# Patient Record
Sex: MALE | Race: OTHER | Employment: OTHER | URBAN - METROPOLITAN AREA
[De-identification: names, ages, dates, MRNs, and addresses within clinical notes are randomized per-mention and may not be internally consistent; named-entity substitution may affect disease eponyms.]

---

## 2020-06-13 ENCOUNTER — APPOINTMENT (OUTPATIENT)
Dept: GENERAL RADIOLOGY | Facility: HOSPITAL | Age: 25
End: 2020-06-13
Attending: EMERGENCY MEDICINE

## 2020-06-13 ENCOUNTER — HOSPITAL ENCOUNTER (EMERGENCY)
Facility: HOSPITAL | Age: 25
Discharge: HOME | End: 2020-06-13
Attending: EMERGENCY MEDICINE

## 2020-06-13 VITALS
HEART RATE: 63 BPM | OXYGEN SATURATION: 99 % | BODY MASS INDEX: 30.31 KG/M2 | SYSTOLIC BLOOD PRESSURE: 136 MMHG | WEIGHT: 200 LBS | DIASTOLIC BLOOD PRESSURE: 78 MMHG | HEIGHT: 68 IN | TEMPERATURE: 98 F | RESPIRATION RATE: 16 BRPM

## 2020-06-13 DIAGNOSIS — M54.16 LUMBAR RADICULOPATHY: Primary | ICD-10-CM

## 2020-06-13 PROCEDURE — 72110 X-RAY EXAM L-2 SPINE 4/>VWS: CPT | Performed by: EMERGENCY MEDICINE

## 2020-06-13 PROCEDURE — 99283 EMERGENCY DEPT VISIT LOW MDM: CPT

## 2020-06-13 RX ORDER — METHYLPREDNISOLONE 4 MG/1
TABLET ORAL
Qty: 1 PACKAGE | Refills: 0 | Status: SHIPPED | OUTPATIENT
Start: 2020-06-13

## 2020-06-13 RX ORDER — HYDROCODONE BITARTRATE AND ACETAMINOPHEN 5; 325 MG/1; MG/1
1-2 TABLET ORAL EVERY 6 HOURS PRN
Qty: 10 TABLET | Refills: 0 | Status: SHIPPED | OUTPATIENT
Start: 2020-06-13 | End: 2020-06-20

## 2020-06-13 NOTE — ED INITIAL ASSESSMENT (HPI)
Left sided pain to left buttocks and left leg x1 year.  Patient states \"its not going away so I thought id come in.\"

## 2020-06-13 NOTE — ED PROVIDER NOTES
Patient Seen in: [facility] Emergency Department      History   Patient presents with:  Back Pain    Stated Complaint: back/leg pain for one year    HPI    This is a pleasant 24-year-old male presenting to the emergency department for leg pain.   Paula Rangel no purulent material or erythema.   No submandibular erythema and no tenderness along the sternocleidomastoid and no nuchal rigidity  Lungs are clear to auscultation bilaterally with no wheezes retractions or rhonchi noted  Cardiovascular exam shows a regul

## 2020-06-17 ENCOUNTER — OFFICE VISIT (OUTPATIENT)
Dept: SURGERY | Facility: CLINIC | Age: 25
End: 2020-06-17

## 2020-06-17 VITALS
BODY MASS INDEX: 30.31 KG/M2 | SYSTOLIC BLOOD PRESSURE: 128 MMHG | DIASTOLIC BLOOD PRESSURE: 80 MMHG | WEIGHT: 200 LBS | HEIGHT: 68 IN | HEART RATE: 66 BPM

## 2020-06-17 DIAGNOSIS — G57.02 PIRIFORMIS SYNDROME OF LEFT SIDE: ICD-10-CM

## 2020-06-17 DIAGNOSIS — M54.16 LUMBAR RADICULOPATHY: Primary | ICD-10-CM

## 2020-06-17 PROCEDURE — 99203 OFFICE O/P NEW LOW 30 MIN: CPT | Performed by: PHYSICIAN ASSISTANT

## 2020-06-17 NOTE — PROGRESS NOTES
Location of Pain:  Pt states that he has been having issues with back pain with buttock pain with radiating pain. Pt states that he has issues with numbness in the left foot. Pt states that he has no issues with weakness.  Pt states that he has no issues wi

## 2020-06-17 NOTE — PROGRESS NOTES
Patient: Lui Hogan  Medical Record Number: [id number]  PCP: None Pcp      HISTORY OF CHIEF COMPLAINT:    Lui Hogan is a 21year old male, who complains of 1 year h/o left buttock pain that radiates down the back of his left leg.  He says when pain ini 10 tablet 0   • methylPREDNISolone (MEDROL) 4 MG Oral Tablet Therapy Pack Dosepack: take as directed 1 Package 0          IMAGING STUDIES:     Lumbar x-ray:  FINDINGS:    Mild L4-5 disc space narrowing. No fracture or subluxation. Intact facet joints.   C bilateral: ankles, knees & hips    MEDICAL DECISION MAKING:   Impression: Lumbar radiculopathy  Left piriformis syndrome    Plan: We discussed the diagnosis and treatment options today.  The patient is intact on exam. He has had some improvement with steroi

## 2020-06-23 ENCOUNTER — OFFICE VISIT (OUTPATIENT)
Dept: PHYSICAL THERAPY | Age: 25
End: 2020-06-23
Attending: PHYSICIAN ASSISTANT

## 2020-06-23 DIAGNOSIS — M54.16 LUMBAR RADICULOPATHY: ICD-10-CM

## 2020-06-23 DIAGNOSIS — G57.02 PIRIFORMIS SYNDROME OF LEFT SIDE: ICD-10-CM

## 2020-06-23 PROCEDURE — 97161 PT EVAL LOW COMPLEX 20 MIN: CPT | Performed by: PHYSICAL THERAPIST

## 2020-06-23 NOTE — PROGRESS NOTES
BACK EVALUATION:    Referring Physician: Cristian Sheppard    DX Code: Lumbar radiculopathy (M54.16)  Piriformis syndrome of left side (G57.02)     PT DX: Lumbar radiculopathy (M54.16)  Piriformis syndrome of left side (G57.02)    PCP: None Pcp     Age: 21 yea lumbar/glut area. Additional Information / Findings:  -  Today’s Treatment:  REIL, R flex/rot, pt ed, PKB, prone hip ext, MENA  HEP: Handouts given  Pt. Education: Patient counseled to maintain / resume normal activities.   (Please close note by pressing activities  · WORK:  Pt. will be able to return to work without restrictions. · Pt. will be able to perform ADLs with no pain. · Pt. will be independent with home exercise program and self management.     Patient will be seen 1 x /week for 4-8 weeks or a

## 2020-06-26 ENCOUNTER — APPOINTMENT (OUTPATIENT)
Dept: PHYSICAL THERAPY | Age: 25
End: 2020-06-26
Attending: PHYSICIAN ASSISTANT

## 2020-06-29 ENCOUNTER — OFFICE VISIT (OUTPATIENT)
Dept: PHYSICAL THERAPY | Age: 25
End: 2020-06-29
Attending: PHYSICIAN ASSISTANT

## 2020-06-29 PROCEDURE — 97110 THERAPEUTIC EXERCISES: CPT | Performed by: PHYSICAL THERAPIST

## 2020-06-29 PROCEDURE — 97140 MANUAL THERAPY 1/> REGIONS: CPT | Performed by: PHYSICAL THERAPIST

## 2020-06-29 NOTE — PROGRESS NOTES
Dx: Lumbar radiculopathy (M54.16)  Piriformis syndrome of left side (G57.02)          Authorized # of Visits:  [id number]         Next MD visit: none scheduled  Fall Risk: standard         Precautions: n/a           Medication Changes since last visit?: No  Subject